# Patient Record
(demographics unavailable — no encounter records)

---

## 2024-12-02 NOTE — HISTORY OF PRESENT ILLNESS
[de-identified] : congested w cough [FreeTextEntry6] : congested w cough past few days spiked fever last night better this am

## 2024-12-02 NOTE — DISCUSSION/SUMMARY
[FreeTextEntry1] : Use humidifier, saline nasal drops, encourage fluids and fever control as needed. Elevate head of bed. Return for spiking fever, worsening symptoms, respiratory distress or concerns. steam This illness is caused by a virus and symptoms may last 7-10 days. Recommend bulb suctions with saline nose drops at home especially before feeding and sleep. Give tylenol if needed for fever or pain. Encourage fluids, prop them up when sleeping, run a cool mist vaporizer at home. Follow up if there is any worsening or difficulty breathing )breathing faster than 50-60 times in one minute or retractions-sucking in the belly just under the ribs with each breath), vomiting.not feeling well or other concerns.

## 2025-05-02 NOTE — HISTORY OF PRESENT ILLNESS
[Normal] : Normal [Yes] : Patient goes to dentist yearly [Vitamin] : Primary Fluoride Source: Vitamin [In Pre-K] : In Pre-K [Appropiate parent-child communication] : Appropriate parent-child communication [No] : No cigarette smoke exposure [Water heater temperature set at <120 degrees F] : Water heater temperature set at <120 degrees F [Car seat in back seat] : Car seat in back seat [Carbon Monoxide Detectors] : Carbon monoxide detectors [Smoke Detectors] : Smoke detectors [Supervised outdoor play] : Supervised outdoor play [Up to date] : Up to date [Father] : father [NO] : No [FreeTextEntry1] : doing well  GETS ST  2X A WEEK

## 2025-05-02 NOTE — DEVELOPMENTAL MILESTONES
[Normal Development] : Normal Development [None] : none [Goes to the bathroom and has] : goes to bathroom and has bowel movement by self [Dresses and undresses without] : dresses and undresses without much help [Uses 4-word sentences] : uses 4-word sentences [Uses words that are 100%] : uses words that are 100% intelligible to strangers [Tells a story from a book] : tells a story from a book [Climbs stairs, alternating feet] : climbs stairs, alternating feet without support [Draws a simple cross] : draws a simple cross [Draws recognizable pictures] : draws recognizable pictures [Yes] : Completed. [Plays make-believe] : plays make-believe [FreeTextEntry1] : GETS SPEECH FOR DYSFLUENCY

## 2025-05-02 NOTE — PHYSICAL EXAM
[Alert] : alert [No Acute Distress] : no acute distress [Playful] : playful [Normocephalic] : normocephalic [Conjunctivae with no discharge] : conjunctivae with no discharge [PERRL] : PERRL [EOMI Bilateral] : EOMI bilateral [Auricles Well Formed] : auricles well formed [Clear Tympanic membranes with present light reflex and bony landmarks] : clear tympanic membranes with present light reflex and bony landmarks [No Discharge] : no discharge [Nares Patent] : nares patent [Pink Nasal Mucosa] : pink nasal mucosa [Palate Intact] : palate intact [Uvula Midline] : uvula midline [Nonerythematous Oropharynx] : nonerythematous oropharynx [No Caries] : no caries [Trachea Midline] : trachea midline [Supple, full passive range of motion] : supple, full passive range of motion [No Palpable Masses] : no palpable masses [Symmetric Chest Rise] : symmetric chest rise [Clear to Auscultation Bilaterally] : clear to auscultation bilaterally [Normoactive Precordium] : normoactive precordium [Regular Rate and Rhythm] : regular rate and rhythm [Normal S1, S2 present] : normal S1, S2 present [+2 Femoral Pulses] : +2 femoral pulses [Soft] : soft [NonTender] : non tender [Non Distended] : non distended [Normoactive Bowel Sounds] : normoactive bowel sounds [No Hepatomegaly] : no hepatomegaly [No Splenomegaly] : no splenomegaly [Deshaun 1] : Deshaun 1 [Central Urethral Opening] : central urethral opening [Testicles Descended Bilaterally] : testicles descended bilaterally [Patent] : patent [Normally Placed] : normally placed [No Abnormal Lymph Nodes Palpated] : no abnormal lymph nodes palpated [Symmetric Buttocks Creases] : symmetric buttocks creases [Symmetric Hip Rotation] : symmetric hip rotation [No Gait Asymmetry] : no gait asymmetry [No pain or deformities with palpation of bone, muscles, joints] : no pain or deformities with palpation of bone, muscles, joints [Normal Muscle Tone] : normal muscle tone [No Spinal Dimple] : no spinal dimple [NoTuft of Hair] : no tuft of hair [Straight] : straight [+2 Patella DTR] : +2 patella DTR [Cranial Nerves Grossly Intact] : cranial nerves grossly intact [No Rash or Lesions] : no rash or lesions [FreeTextEntry8] : 1/6 INNOCENT M

## 2025-05-02 NOTE — DISCUSSION/SUMMARY
[Normal Growth] : growth [Normal Development] : development  [No Elimination Concerns] : elimination [Continue Regimen] : feeding [No Skin Concerns] : skin [Normal Sleep Pattern] : sleep [None] : no medical problems [School Readiness] : school readiness [Healthy Personal Habits] : healthy personal habits [TV/Media] : tv/media [Child and Family Involvement] : child and family involvement [Safety] : safety [Anticipatory Guidance Given] : Anticipatory guidance addressed as per the history of present illness section [No Vaccines] : no vaccines needed [No Medications] : ~He/She~ is not on any medications [] : The components of the vaccine(s) to be administered today are listed in the plan of care. The disease(s) for which the vaccine(s) are intended to prevent and the risks have been discussed with the caretaker.  The risks are also included in the appropriate vaccination information statements which have been provided to the patient's caregiver.  The caregiver has given consent to vaccinate. [FreeTextEntry1] : Discussed safety/feeding/sleep as appropriate for age.  Time allowed for questions and all answered with understanding.  Developmental screening Tool reviewed and discussed with parent. The child is developing normally. There are no delays in speech, gross motor, fine motor and socialization skills. SWYC for age  TB risk assessment completed - no risk for TB. PPD not required.  Lead screen questionnaire passed. No risks at this time.  Continue balanced diet with all food groups. Brush teeth twice a day with toothbrush. Recommend visit to dentist. As per car seat 's guidelines, use forward-facing booster seat until child reaches highest weight/height for seat. Child needs to ride in a belt-positioning booster seat until  4 feet 9 inches has been reached and are between 8 and 12 years of age.  Put child to sleep in own bed. Help child to maintain consistent daily routines and sleep schedule. Pre-K discussed. Ensure home is safe. Teach child about personal safety. Use consistent, positive discipline. Read aloud to child. Limit screen time to no more than 2 hours per day.  Speech dysfluency RECVEIVES ST  RTO 12 mos Flu in Fall